# Patient Record
Sex: FEMALE | Race: BLACK OR AFRICAN AMERICAN | NOT HISPANIC OR LATINO | URBAN - METROPOLITAN AREA
[De-identification: names, ages, dates, MRNs, and addresses within clinical notes are randomized per-mention and may not be internally consistent; named-entity substitution may affect disease eponyms.]

---

## 2020-02-12 ENCOUNTER — OFFICE (OUTPATIENT)
Dept: URBAN - METROPOLITAN AREA CLINIC 12 | Facility: CLINIC | Age: 50
End: 2020-02-12
Payer: COMMERCIAL

## 2020-02-12 VITALS
WEIGHT: 229 LBS | DIASTOLIC BLOOD PRESSURE: 83 MMHG | SYSTOLIC BLOOD PRESSURE: 159 MMHG | HEIGHT: 64 IN | HEART RATE: 79 BPM

## 2020-02-12 DIAGNOSIS — K64.9 UNSPECIFIED HEMORRHOIDS: ICD-10-CM

## 2020-02-12 PROCEDURE — 99203 OFFICE O/P NEW LOW 30 MIN: CPT | Performed by: INTERNAL MEDICINE

## 2020-02-12 RX ORDER — POLYETHYLENE GLYCOL 3350, SODIUM SULFATE, SODIUM CHLORIDE, POTASSIUM CHLORIDE, ASCORBIC ACID, SODIUM ASCORBATE 140-9-5.2G
KIT ORAL
Qty: 1 | Refills: 0 | Status: COMPLETED
Start: 2020-02-12 | End: 2020-02-26

## 2020-02-26 ENCOUNTER — AMBULATORY SURGICAL CENTER (OUTPATIENT)
Dept: URBAN - METROPOLITAN AREA SURGERY 2 | Facility: SURGERY | Age: 50
End: 2020-02-26
Payer: COMMERCIAL

## 2020-02-26 ENCOUNTER — OFFICE (OUTPATIENT)
Dept: URBAN - METROPOLITAN AREA PATHOLOGY 22 | Facility: PATHOLOGY | Age: 50
End: 2020-02-26
Payer: COMMERCIAL

## 2020-02-26 VITALS
OXYGEN SATURATION: 100 % | OXYGEN SATURATION: 99 % | SYSTOLIC BLOOD PRESSURE: 163 MMHG | RESPIRATION RATE: 18 BRPM | SYSTOLIC BLOOD PRESSURE: 151 MMHG | HEART RATE: 69 BPM | RESPIRATION RATE: 16 BRPM | HEART RATE: 70 BPM | RESPIRATION RATE: 18 BRPM | HEART RATE: 69 BPM | OXYGEN SATURATION: 99 % | TEMPERATURE: 98.4 F | DIASTOLIC BLOOD PRESSURE: 84 MMHG | SYSTOLIC BLOOD PRESSURE: 150 MMHG | HEART RATE: 68 BPM | DIASTOLIC BLOOD PRESSURE: 90 MMHG | TEMPERATURE: 98.1 F | HEART RATE: 70 BPM | SYSTOLIC BLOOD PRESSURE: 150 MMHG | SYSTOLIC BLOOD PRESSURE: 151 MMHG | WEIGHT: 228 LBS | RESPIRATION RATE: 16 BRPM | TEMPERATURE: 98.4 F | DIASTOLIC BLOOD PRESSURE: 98 MMHG | HEIGHT: 64 IN | RESPIRATION RATE: 15 BRPM | HEART RATE: 69 BPM | SYSTOLIC BLOOD PRESSURE: 163 MMHG | HEART RATE: 75 BPM | SYSTOLIC BLOOD PRESSURE: 136 MMHG | HEART RATE: 75 BPM | SYSTOLIC BLOOD PRESSURE: 163 MMHG | TEMPERATURE: 98.1 F | DIASTOLIC BLOOD PRESSURE: 91 MMHG | DIASTOLIC BLOOD PRESSURE: 99 MMHG | HEART RATE: 68 BPM | OXYGEN SATURATION: 99 % | RESPIRATION RATE: 18 BRPM | OXYGEN SATURATION: 100 % | OXYGEN SATURATION: 100 % | HEART RATE: 68 BPM | DIASTOLIC BLOOD PRESSURE: 84 MMHG | RESPIRATION RATE: 15 BRPM | DIASTOLIC BLOOD PRESSURE: 84 MMHG | SYSTOLIC BLOOD PRESSURE: 150 MMHG | DIASTOLIC BLOOD PRESSURE: 98 MMHG | DIASTOLIC BLOOD PRESSURE: 99 MMHG | SYSTOLIC BLOOD PRESSURE: 152 MMHG | DIASTOLIC BLOOD PRESSURE: 98 MMHG | HEIGHT: 64 IN | SYSTOLIC BLOOD PRESSURE: 152 MMHG | WEIGHT: 228 LBS | SYSTOLIC BLOOD PRESSURE: 136 MMHG | DIASTOLIC BLOOD PRESSURE: 91 MMHG | RESPIRATION RATE: 16 BRPM | HEIGHT: 64 IN | SYSTOLIC BLOOD PRESSURE: 151 MMHG | SYSTOLIC BLOOD PRESSURE: 152 MMHG | WEIGHT: 228 LBS | TEMPERATURE: 98.4 F | HEART RATE: 75 BPM | RESPIRATION RATE: 15 BRPM | HEART RATE: 70 BPM | DIASTOLIC BLOOD PRESSURE: 99 MMHG | DIASTOLIC BLOOD PRESSURE: 90 MMHG | SYSTOLIC BLOOD PRESSURE: 136 MMHG | TEMPERATURE: 98.1 F | DIASTOLIC BLOOD PRESSURE: 91 MMHG | DIASTOLIC BLOOD PRESSURE: 90 MMHG

## 2020-02-26 DIAGNOSIS — K92.1 MELENA: ICD-10-CM

## 2020-02-26 DIAGNOSIS — K63.5 POLYP OF COLON: ICD-10-CM

## 2020-02-26 PROBLEM — D12.3 BENIGN NEOPLASM OF TRANSVERSE COLON: Status: ACTIVE | Noted: 2020-02-26

## 2020-02-26 PROBLEM — K92.2 EVALUATION OF UNEXPLAINED GI BLEEDING: Status: ACTIVE | Noted: 2020-02-26

## 2020-02-26 PROCEDURE — 88305 TISSUE EXAM BY PATHOLOGIST: CPT | Performed by: INTERNAL MEDICINE

## 2020-02-26 PROCEDURE — 45380 COLONOSCOPY AND BIOPSY: CPT | Performed by: INTERNAL MEDICINE

## 2025-06-30 ENCOUNTER — OFFICE (OUTPATIENT)
Dept: URBAN - METROPOLITAN AREA CLINIC 19 | Facility: CLINIC | Age: 55
End: 2025-06-30
Payer: COMMERCIAL

## 2025-06-30 VITALS
HEIGHT: 64 IN | WEIGHT: 234 LBS | HEART RATE: 70 BPM | DIASTOLIC BLOOD PRESSURE: 78 MMHG | OXYGEN SATURATION: 98 % | SYSTOLIC BLOOD PRESSURE: 137 MMHG

## 2025-06-30 DIAGNOSIS — K59.00 CONSTIPATION, UNSPECIFIED: ICD-10-CM

## 2025-06-30 DIAGNOSIS — K62.89 OTHER SPECIFIED DISEASES OF ANUS AND RECTUM: ICD-10-CM

## 2025-06-30 DIAGNOSIS — Z80.0 FAMILY HISTORY OF MALIGNANT NEOPLASM OF DIGESTIVE ORGANS: ICD-10-CM

## 2025-06-30 PROCEDURE — 99204 OFFICE O/P NEW MOD 45 MIN: CPT | Performed by: NURSE PRACTITIONER

## 2025-06-30 RX ORDER — HYDROCORTISONE ACETATE 25 MG/1
SUPPOSITORY RECTAL
Qty: 10 | Refills: 2 | Status: ACTIVE
Start: 2025-06-30

## 2025-06-30 RX ORDER — SODIUM SULFATE, POTASSIUM SULFATE, MAGNESIUM SULFATE 17.5; 3.13; 1.6 G/ML; G/ML; G/ML
SOLUTION, CONCENTRATE ORAL
Qty: 177 | Refills: 0 | Status: ACTIVE
Start: 2025-06-30

## 2025-06-30 NOTE — SERVICENOTES
The patient's assessment was reviewed with Clayton Taylor MD and a collaborative plan of care was established.

## 2025-06-30 NOTE — SERVICEHPINOTES
54-year-old  black female former patient of Charlette Howe MD returns with complaints of rectal pain and needs to schedule a surveillance colonoscopy.  She has been having intermittent rectal pain for the last 5 years that she attributes to hemorrhoids.  She says the pain is a dull aching feeling and occurs 1-2 times weekly.  She would like to schedule a colonoscopy for further evaluation.  She denies any nausea, vomiting, reflux, dysphagia, abdominal pain, change in bowel habit or overt GI bleeding.  She is not currently on any GI medications.
br
br Colonoscopy 02/26/2020 documented internal and external hemorrhoids, benign lymphoid follicle and edematous colonic mucosa at the rectosigmoid junction.  Family history of colon cancer in her maternal grandmother at age 80.